# Patient Record
Sex: FEMALE | HISPANIC OR LATINO | URBAN - METROPOLITAN AREA
[De-identification: names, ages, dates, MRNs, and addresses within clinical notes are randomized per-mention and may not be internally consistent; named-entity substitution may affect disease eponyms.]

---

## 2023-09-19 ENCOUNTER — APPOINTMENT (RX ONLY)
Dept: URBAN - METROPOLITAN AREA CLINIC 93 | Facility: CLINIC | Age: 43
Setting detail: DERMATOLOGY
End: 2023-09-19

## 2023-09-19 DIAGNOSIS — L70.0 ACNE VULGARIS: ICD-10-CM | Status: INADEQUATELY CONTROLLED

## 2023-09-19 PROCEDURE — ? FULL BODY SKIN EXAM - DECLINED

## 2023-09-19 PROCEDURE — ? PRESCRIPTION

## 2023-09-19 PROCEDURE — ? RECOMMENDATIONS

## 2023-09-19 PROCEDURE — 99204 OFFICE O/P NEW MOD 45 MIN: CPT

## 2023-09-19 PROCEDURE — ? COUNSELING

## 2023-09-19 RX ORDER — DOXYCYCLINE HYCLATE 60 MG/1
TABLET, DELAYED RELEASE ORAL
Qty: 90 | Refills: 1 | Status: ERX | COMMUNITY
Start: 2023-09-19

## 2023-09-19 RX ORDER — CLINDAMYCIN PHOSPHATE AND BENZOYL PEROXIDE 10; 37.5 MG/G; MG/G
GEL TOPICAL
Qty: 50 | Refills: 3 | Status: ERX | COMMUNITY
Start: 2023-09-19

## 2023-09-19 RX ADMIN — CLINDAMYCIN PHOSPHATE AND BENZOYL PEROXIDE: 10; 37.5 GEL TOPICAL at 00:00

## 2023-09-19 RX ADMIN — DOXYCYCLINE HYCLATE: 60 TABLET, DELAYED RELEASE ORAL at 00:00

## 2023-09-19 ASSESSMENT — LOCATION DETAILED DESCRIPTION DERM
LOCATION DETAILED: LEFT INFERIOR MEDIAL MALAR CHEEK
LOCATION DETAILED: RIGHT INFERIOR MEDIAL MALAR CHEEK

## 2023-09-19 ASSESSMENT — LOCATION SIMPLE DESCRIPTION DERM
LOCATION SIMPLE: RIGHT CHEEK
LOCATION SIMPLE: LEFT CHEEK

## 2023-09-19 ASSESSMENT — LOCATION ZONE DERM: LOCATION ZONE: FACE

## 2023-09-19 NOTE — PROCEDURE: COUNSELING
Doxycycline Pregnancy And Lactation Text: This medication is Pregnancy Category D and not consider safe during pregnancy. It is also excreted in breast milk but is considered safe for shorter treatment courses.
Dapsone Counseling: I discussed with the patient the risks of dapsone including but not limited to hemolytic anemia, agranulocytosis, rashes, methemoglobinemia, kidney failure, peripheral neuropathy, headaches, GI upset, and liver toxicity.  Patients who start dapsone require monitoring including baseline LFTs and weekly CBCs for the first month, then every month thereafter.  The patient verbalized understanding of the proper use and possible adverse effects of dapsone.  All of the patient's questions and concerns were addressed.
Spironolactone Pregnancy And Lactation Text: This medication can cause feminization of the male fetus and should be avoided during pregnancy. The active metabolite is also found in breast milk.
Sarecycline Counseling: Patient advised regarding possible photosensitivity and discoloration of the teeth, skin, lips, tongue and gums.  Patient instructed to avoid sunlight, if possible.  When exposed to sunlight, patients should wear protective clothing, sunglasses, and sunscreen.  The patient was instructed to call the office immediately if the following severe adverse effects occur:  hearing changes, easy bruising/bleeding, severe headache, or vision changes.  The patient verbalized understanding of the proper use and possible adverse effects of sarecycline.  All of the patient's questions and concerns were addressed.
Topical Clindamycin Counseling: Patient counseled that this medication may cause skin irritation or allergic reactions.  In the event of skin irritation, the patient was advised to reduce the amount of the drug applied or use it less frequently.   The patient verbalized understanding of the proper use and possible adverse effects of clindamycin.  All of the patient's questions and concerns were addressed.
Birth Control Pills Counseling: Birth Control Pill Counseling: I discussed with the patient the potential side effects of OCPs including but not limited to increased risk of stroke, heart attack, thrombophlebitis, deep venous thrombosis, hepatic adenomas, breast changes, GI upset, headaches, and depression.  The patient verbalized understanding of the proper use and possible adverse effects of OCPs. All of the patient's questions and concerns were addressed.
Benzoyl Peroxide Counseling: Patient counseled that medicine may cause skin irritation and bleach clothing.  In the event of skin irritation, the patient was advised to reduce the amount of the drug applied or use it less frequently.   The patient verbalized understanding of the proper use and possible adverse effects of benzoyl peroxide.  All of the patient's questions and concerns were addressed.
Azithromycin Pregnancy And Lactation Text: This medication is considered safe during pregnancy and is also secreted in breast milk.
Doxycycline Counseling:  Patient counseled regarding possible photosensitivity and increased risk for sunburn.  Patient instructed to avoid sunlight, if possible.  When exposed to sunlight, patients should wear protective clothing, sunglasses, and sunscreen.  The patient was instructed to call the office immediately if the following severe adverse effects occur:  hearing changes, easy bruising/bleeding, severe headache, or vision changes.  The patient verbalized understanding of the proper use and possible adverse effects of doxycycline.  All of the patient's questions and concerns were addressed.
Birth Control Pills Pregnancy And Lactation Text: This medication should be avoided if pregnant and for the first 30 days post-partum.
Bactrim Counseling:  I discussed with the patient the risks of sulfa antibiotics including but not limited to GI upset, allergic reaction, drug rash, diarrhea, dizziness, photosensitivity, and yeast infections.  Rarely, more serious reactions can occur including but not limited to aplastic anemia, agranulocytosis, methemoglobinemia, blood dyscrasias, liver or kidney failure, lung infiltrates or desquamative/blistering drug rashes.
Topical Sulfur Applications Pregnancy And Lactation Text: This medication is Pregnancy Category C and has an unknown safety profile during pregnancy. It is unknown if this topical medication is excreted in breast milk.
High Dose Vitamin A Pregnancy And Lactation Text: High dose vitamin A therapy is contraindicated during pregnancy and breast feeding.
Topical Retinoid counseling:  Patient advised to apply a pea-sized amount only at bedtime and wait 30 minutes after washing their face before applying.  If too drying, patient may add a non-comedogenic moisturizer. The patient verbalized understanding of the proper use and possible adverse effects of retinoids.  All of the patient's questions and concerns were addressed.
Tetracycline Counseling: Patient counseled regarding possible photosensitivity and increased risk for sunburn.  Patient instructed to avoid sunlight, if possible.  When exposed to sunlight, patients should wear protective clothing, sunglasses, and sunscreen.  The patient was instructed to call the office immediately if the following severe adverse effects occur:  hearing changes, easy bruising/bleeding, severe headache, or vision changes.  The patient verbalized understanding of the proper use and possible adverse effects of tetracycline.  All of the patient's questions and concerns were addressed. Patient understands to avoid pregnancy while on therapy due to potential birth defects.
Minocycline Counseling: Patient advised regarding possible photosensitivity and discoloration of the teeth, skin, lips, tongue and gums.  Patient instructed to avoid sunlight, if possible.  When exposed to sunlight, patients should wear protective clothing, sunglasses, and sunscreen.  The patient was instructed to call the office immediately if the following severe adverse effects occur:  hearing changes, easy bruising/bleeding, severe headache, or vision changes.  The patient verbalized understanding of the proper use and possible adverse effects of minocycline.  All of the patient's questions and concerns were addressed.
Isotretinoin Counseling: Patient should get monthly blood tests, not donate blood, not drive at night if vision affected, not share medication, and not undergo elective surgery for 6 months after tx completed. Side effects reviewed, pt to contact office should one occur.
Tazorac Pregnancy And Lactation Text: This medication is not safe during pregnancy. It is unknown if this medication is excreted in breast milk.
Use Enhanced Medication Counseling?: No
Dapsone Pregnancy And Lactation Text: This medication is Pregnancy Category C and is not considered safe during pregnancy or breast feeding.
Topical Clindamycin Pregnancy And Lactation Text: This medication is Pregnancy Category B and is considered safe during pregnancy. It is unknown if it is excreted in breast milk.
Topical Retinoid Pregnancy And Lactation Text: This medication is Pregnancy Category C. It is unknown if this medication is excreted in breast milk.
Spironolactone Counseling: Patient advised regarding risks of diarrhea, abdominal pain, hyperkalemia, birth defects (for female patients), liver toxicity and renal toxicity. The patient may need blood work to monitor liver and kidney function and potassium levels while on therapy. The patient verbalized understanding of the proper use and possible adverse effects of spironolactone.  All of the patient's questions and concerns were addressed.
Isotretinoin Pregnancy And Lactation Text: This medication is Pregnancy Category X and is considered extremely dangerous during pregnancy. It is unknown if it is excreted in breast milk.
Sarecycline Pregnancy And Lactation Text: This medication is Pregnancy Category D and not consider safe during pregnancy. It is also excreted in breast milk.
Tazorac Counseling:  Patient advised that medication is irritating and drying.  Patient may need to apply sparingly and wash off after an hour before eventually leaving it on overnight.  The patient verbalized understanding of the proper use and possible adverse effects of tazorac.  All of the patient's questions and concerns were addressed.
Topical Sulfur Applications Counseling: Topical Sulfur Counseling: Patient counseled that this medication may cause skin irritation or allergic reactions.  In the event of skin irritation, the patient was advised to reduce the amount of the drug applied or use it less frequently.   The patient verbalized understanding of the proper use and possible adverse effects of topical sulfur application.  All of the patient's questions and concerns were addressed.
Detail Level: Zone
High Dose Vitamin A Counseling: Side effects reviewed, pt to contact office should one occur.
Erythromycin Pregnancy And Lactation Text: This medication is Pregnancy Category B and is considered safe during pregnancy. It is also excreted in breast milk.
Bactrim Pregnancy And Lactation Text: This medication is Pregnancy Category D and is known to cause fetal risk.  It is also excreted in breast milk.
Benzoyl Peroxide Pregnancy And Lactation Text: This medication is Pregnancy Category C. It is unknown if benzoyl peroxide is excreted in breast milk.
Azithromycin Counseling:  I discussed with the patient the risks of azithromycin including but not limited to GI upset, allergic reaction, drug rash, diarrhea, and yeast infections.
Erythromycin Counseling:  I discussed with the patient the risks of erythromycin including but not limited to GI upset, allergic reaction, drug rash, diarrhea, increase in liver enzymes, and yeast infections.
Winlevi Pregnancy And Lactation Text: This medication is considered safe during pregnancy and breastfeeding.
Aklief Pregnancy And Lactation Text: It is unknown if this medication is safe to use during pregnancy.  It is unknown if this medication is excreted in breast milk.  Breastfeeding women should use the topical cream on the smallest area of the skin for the shortest time needed while breastfeeding.  Do not apply to nipple and areola.
Azelaic Acid Pregnancy And Lactation Text: This medication is considered safe during pregnancy and breast feeding.
Winlevi Counseling:  I discussed with the patient the risks of topical clascoterone including but not limited to erythema, scaling, itching, and stinging. Patient voiced their understanding.
Aklief counseling:  Patient advised to apply a pea-sized amount only at bedtime and wait 30 minutes after washing their face before applying.  If too drying, patient may add a non-comedogenic moisturizer.  The most commonly reported side effects including irritation, redness, scaling, dryness, stinging, burning, itching, and increased risk of sunburn.  The patient verbalized understanding of the proper use and possible adverse effects of retinoids.  All of the patient's questions and concerns were addressed.
Azelaic Acid Counseling: Patient counseled that medicine may cause skin irritation and to avoid applying near the eyes.  In the event of skin irritation, the patient was advised to reduce the amount of the drug applied or use it less frequently.   The patient verbalized understanding of the proper use and possible adverse effects of azelaic acid.  All of the patient's questions and concerns were addressed.

## 2023-10-31 ENCOUNTER — APPOINTMENT (RX ONLY)
Dept: URBAN - METROPOLITAN AREA CLINIC 93 | Facility: CLINIC | Age: 43
Setting detail: DERMATOLOGY
End: 2023-10-31

## 2023-10-31 DIAGNOSIS — L70.0 ACNE VULGARIS: ICD-10-CM

## 2023-10-31 PROCEDURE — ? COUNSELING

## 2023-10-31 PROCEDURE — ? PRESCRIPTION MEDICATION MANAGEMENT

## 2023-10-31 PROCEDURE — ? PRESCRIPTION

## 2023-10-31 PROCEDURE — ? RECOMMENDATIONS

## 2023-10-31 PROCEDURE — 99214 OFFICE O/P EST MOD 30 MIN: CPT

## 2023-10-31 RX ORDER — DOXYCYCLINE HYCLATE 60 MG/1
TABLET, DELAYED RELEASE ORAL
Qty: 90 | Refills: 0 | Status: ERX

## 2023-10-31 ASSESSMENT — LOCATION SIMPLE DESCRIPTION DERM
LOCATION SIMPLE: RIGHT CHEEK
LOCATION SIMPLE: LEFT CHEEK

## 2023-10-31 ASSESSMENT — LOCATION ZONE DERM: LOCATION ZONE: FACE

## 2023-10-31 NOTE — PROCEDURE: PRESCRIPTION MEDICATION MANAGEMENT
Continue Regimen: Onexton 1.2 % (1 % base)-3.75 % topical gel with pump BID
Modify Regimen: Doryx MPC 60 mg tablet,delayed release: decrease from TID to BID
Render In Strict Bullet Format?: No
Plan: significant improvement on treatment\\ndecreasing PO dosage to BID\\nwill continue to monitor
Detail Level: Zone

## 2023-12-12 ENCOUNTER — APPOINTMENT (RX ONLY)
Dept: URBAN - METROPOLITAN AREA CLINIC 93 | Facility: CLINIC | Age: 43
Setting detail: DERMATOLOGY
End: 2023-12-12

## 2023-12-12 DIAGNOSIS — L70.0 ACNE VULGARIS: ICD-10-CM

## 2023-12-12 PROCEDURE — ? RECOMMENDATIONS

## 2023-12-12 PROCEDURE — 99214 OFFICE O/P EST MOD 30 MIN: CPT

## 2023-12-12 PROCEDURE — ? PRESCRIPTION MEDICATION MANAGEMENT

## 2023-12-12 PROCEDURE — ? COUNSELING

## 2023-12-12 PROCEDURE — ? PRESCRIPTION

## 2023-12-12 RX ORDER — CLINDAMYCIN PHOSPHATE AND BENZOYL PEROXIDE 10; 37.5 MG/G; MG/G
GEL TOPICAL
Qty: 50 | Refills: 6 | Status: ERX

## 2023-12-12 ASSESSMENT — LOCATION SIMPLE DESCRIPTION DERM
LOCATION SIMPLE: LEFT CHEEK
LOCATION SIMPLE: RIGHT CHEEK

## 2023-12-12 ASSESSMENT — LOCATION ZONE DERM: LOCATION ZONE: FACE

## 2023-12-12 ASSESSMENT — LOCATION DETAILED DESCRIPTION DERM
LOCATION DETAILED: RIGHT INFERIOR MEDIAL MALAR CHEEK
LOCATION DETAILED: LEFT INFERIOR MEDIAL MALAR CHEEK

## 2023-12-12 NOTE — PROCEDURE: PRESCRIPTION MEDICATION MANAGEMENT
Continue Regimen: Onexton 1.2 % (1 % base)-3.75 % topical gel BID
Modify Regimen: Doryx MPC 60 mg tablet; decrease from BID to QD x 1 month then d/c (pt has enough medication remaining at home)
Render In Strict Bullet Format?: No
Plan: significant improvement on treatment\\nwill continue to monitor\\nconsider topical retinoid rx as warranted
Detail Level: Zone

## 2024-03-01 ENCOUNTER — APPOINTMENT (RX ONLY)
Dept: URBAN - METROPOLITAN AREA CLINIC 93 | Facility: CLINIC | Age: 44
Setting detail: DERMATOLOGY
End: 2024-03-01

## 2024-03-01 DIAGNOSIS — L70.0 ACNE VULGARIS: ICD-10-CM | Status: IMPROVED

## 2024-03-01 PROCEDURE — ? RECOMMENDATIONS

## 2024-03-01 PROCEDURE — 99214 OFFICE O/P EST MOD 30 MIN: CPT

## 2024-03-01 PROCEDURE — ? COUNSELING

## 2024-03-01 PROCEDURE — ? PRESCRIPTION MEDICATION MANAGEMENT

## 2024-03-01 PROCEDURE — ? PRESCRIPTION

## 2024-03-01 RX ORDER — TRIFAROTENE 50 UG/G
CREAM TOPICAL
Qty: 45 | Refills: 3 | Status: CANCELLED | COMMUNITY
Start: 2024-03-01

## 2024-03-01 RX ORDER — TRIFAROTENE 50 UG/G
CREAM TOPICAL
Qty: 45 | Refills: 3 | Status: ERX

## 2024-03-01 RX ORDER — CLINDAMYCIN PHOSPHATE AND BENZOYL PEROXIDE 10; 37.5 MG/G; MG/G
GEL TOPICAL
Qty: 50 | Refills: 6 | Status: CANCELLED
Stop reason: SDUPTHER

## 2024-03-01 RX ADMIN — TRIFAROTENE: 50 CREAM TOPICAL at 00:00

## 2024-03-01 ASSESSMENT — LOCATION SIMPLE DESCRIPTION DERM
LOCATION SIMPLE: RIGHT CHEEK
LOCATION SIMPLE: LEFT CHEEK

## 2024-03-01 ASSESSMENT — LOCATION ZONE DERM: LOCATION ZONE: FACE

## 2024-03-01 NOTE — PROCEDURE: PRESCRIPTION MEDICATION MANAGEMENT
Continue Regimen: Onexton 1.2 % (1 % base)-3.75 % topical gel BID
Discontinue Regimen: Doryx MPC 60 mg tablet
Render In Strict Bullet Format?: No
Plan: significant improvement on treatment\\nincorporating topical retinoid\\nwill continue to monitor
Detail Level: Zone

## 2024-03-28 ENCOUNTER — APPOINTMENT (RX ONLY)
Dept: URBAN - METROPOLITAN AREA CLINIC 93 | Facility: CLINIC | Age: 44
Setting detail: DERMATOLOGY
End: 2024-03-28

## 2024-03-28 DIAGNOSIS — Z41.9 ENCOUNTER FOR PROCEDURE FOR PURPOSES OTHER THAN REMEDYING HEALTH STATE, UNSPECIFIED: ICD-10-CM

## 2024-03-28 PROCEDURE — ? ADDITIONAL NOTES

## 2024-03-28 PROCEDURE — ? PRODUCT LINE (REVISION)

## 2024-03-28 PROCEDURE — ? COSMETIC CONSULTATION: HYDRAFACIAL

## 2024-03-28 PROCEDURE — ? PRODUCT LINE (OBAGI)

## 2024-03-28 PROCEDURE — ? COSMETIC QUOTE

## 2024-03-28 PROCEDURE — ? COSMETIC CONSULTATION - MICRO-NEEDLING

## 2024-03-28 NOTE — PROCEDURE: COSMETIC QUOTE
Include Sales Tax On Implants: Yes
Body Procedure 10 Price/Unit (In Dollars- Use Only Numbers And Decimals): 0.00
Breast Procedure 4 Units: 0
Include Sales Tax On Facility Fees: No
Laser 1: Venus Versa IPL
Face Procedure 1 Price/Unit (In Dollars- Use Only Numbers And Decimals): 398.41
Number Of Months: 1
Face Procedure 1 Percentage Discount: 15
Face Procedure 2 Price/Unit (In Dollars- Use Only Numbers And Decimals): 265
Face Procedure 1: microneedling (includes Revision CMT post procedure cream)
Laser 1 Price/Unit (In Dollars- Use Only Numbers And Decimals): 400
Face Procedure 1 Units: 4
Face Procedure 2: Deluxe HydraFacial
Detail Level: Zone
Face Procedure 2 Units: 2

## 2024-03-28 NOTE — PROCEDURE: PRODUCT LINE (REVISION)
Product 13 Units: 0
Name Of Product 8: DEJ eye cream
Product 16 Price (In Dollars - Numeric Only, No Special Characters Or $): 0.00
Allow Plan To Count Towards E/M Coding: Yes
Product 4 Application Directions: each morning after cleansing and application of facial serums apply intellishade truphysical
Product 4 Units: 1
Product 8 Price (In Dollars - Numeric Only, No Special Characters Or $): 121.41
Name Of Product 2: Vitmamin C Correction cream
Name Of Product 5: C+ brightening eye complex
Send Charges To Patient Encounter: No
Product 8 Application Directions: after facial cleansing apply a pea sized amount to lower eyelids twice daily.
Product 2 Price (In Dollars - Numeric Only, No Special Characters Or $): 181.05
Product 5 Price (In Dollars - Numeric Only, No Special Characters Or $): 119.37
Name Of Product 9: Revision Starter kit
Product 2 Application Directions: After facial cleansing and application of Obagi Retivance, apply Revision Vitamin C Correction cream follow with application of sun screen.
Product 5 Application Directions: each morning and evening after facial cleansing, apply a small amount onto lower eyelids. follow with daily mosturizer
Name Of Product 1: Brightening faceial cleanser
Product 9 Price (In Dollars - Numeric Only, No Special Characters Or $): 199.16
Name Of Product 3: Revision Retinol.05
Product 3 Price (In Dollars - Numeric Only, No Special Characters Or $): 110.50
Name Of Product 6: retinol 1.0
Product 1 Price (In Dollars - Numeric Only, No Special Characters Or $): 42.60
Product 6 Price (In Dollars - Numeric Only, No Special Characters Or $): 135.26
Detail Level: Zone
Name Of Product 7: DEJ Face Cream
Product 7 Price (In Dollars - Numeric Only, No Special Characters Or $): 168.27
Product 1 Application Directions: each morning and evening use brightening facial cleanser to gently cleanse and exfoliate skin.
Product 3 Application Directions: In the evening after facial cleansing, and application of Vitamin C correction cream, apply a pea sized amount of retinol onto the full face.
Product 6 Application Directions: for the 4-8 weeks of product use, utilize retinol1.0 on Monday, Wednesday, and Friday nights only. method of use: in the evening after facial cleansing, apply a peas sized amount of retinol onto the entire face (avoiding the corners of the eyes, nose, and mouth) then follow with evening moisturizer.
Name Of Product 4: Intellishade Trusphysical daily moisturizer
Assigning Risk Information: Per AMA, level of risk is based upon consequences of the problem(s) addressed at the encounter when appropriately treated. Risk also includes medical decision making related to the need to initiate or forego further testing, treatment and/or hospitalization. Over the counter medication are assigned a risk level of low. Prescription medication management is assigned a risk level of moderate.
Risk Of Complication Category: No MDM
Product 4 Price (In Dollars - Numeric Only, No Special Characters Or $): 85.20
Product 7 Application Directions: each evening following facial cleansing apply DEJ Face cream onto full face follow with Vitamin C correction cream

## 2024-03-28 NOTE — PROCEDURE: ADDITIONAL NOTES
Render Risk Assessment In Note?: no
Additional Notes: The patient is present due to concerns of acne scarring, enlarged pores, pore congestions, and milia. Upon evaluation of her skin I informed her that it would be in her best interest to receive a combination of Deluxe HydraFacial treatments and SkinPen microneedling treatments. The patient and I reviewed the following treatment details:\\n\\nHydraFacial Deluxe treatment: The patient was informed that this is a 6 step treatment process targeting pore congestion and maximizing her overall hydration. I reviewed the following 6 step treatment process: hydradermabrasion, gly/sal peel, extractions, murad clarifying booster, blue led light therapy, and antiox+. The patient was quoted $265/Deluxe HydraFacial treatment.\\n\\nMicroneedling: The patient was informed that microneedling is an organic mechanical treatment approach that utilizes the body's natural ability to promote collagen remodeling, cellular regeneration, and promotion of elasticity of the skin. The patient was informed that she would most benefit from a series of 4 microneedling treatments performed 1 month apart from each other. The patient was quoted $398.23/microneedling full face treatment (includes Revision CMT post procedure cream)
Detail Level: Simple

## 2024-03-28 NOTE — PROCEDURE: PRODUCT LINE (OBAGI)
Risk Of Complication Category: No MDM
Product 45 Units: 0
Product 22 Price (In Dollars - Numeric Only, No Special Characters Or $): 0.00
Name Of Product 5: Clear
Name Of Product 8: rebalance
Product 2 Price (In Dollars - Numeric Only, No Special Characters Or $): 119.28
Product 5 Price (In Dollars - Numeric Only, No Special Characters Or $): 138.45
Product 11 Application Directions: Mornings:\\n1. foaming cleanser\\n2. Nu Derm toner (use round cotton pad for application)\\n3. Clear (pea sized amount for full face application)\\n4. Exfoderm forte (pea sized amount for full face application)\\n5. Hydrate\\n6. Sunshield\\n\\nEvenings:\\n1. Foaming cleanser\\n2. Nu derm toner\\n3. Clear (pea sized amount for full face)\\n4.  (mix pea sized amount of  with pea sized amount of Obagi .05 retinol)\\n5. Hydrate\\n\\n***Use Obagi .05 retinol on Monday, Wednesday, and Thursday nights only, until instructed otherwise. The nights you do not use retinol you will simply use the  on its own.***
Allow Plan To Count Towards E/M Coding: No (this will remove associated impression from E/M calculation)
Product 8 Price (In Dollars - Numeric Only, No Special Characters Or $): 117.15
Product 2 Application Directions: each morning after facial cleansing apply 5-6 drops of Vitamin C serum onto full face, follow with Hydro drops, daily moisturizer and SPF
Name Of Product 12: Retivance
Product 5 Application Directions: Each morning and evening after cleansing posterior neck, apply a pea sized amount on to region and spread evenly. Follow with gentle moisturizer and SPF for morning use.
Send Charges To Patient Encounter: No
Product 12 Price (In Dollars - Numeric Only, No Special Characters Or $): 138
Name Of Product 3: Obagi Hydrodrops
Product 8 Application Directions: each morning and evening apply rebalance moisturizer following application of facial serums. For mornings apply Obagi Warm sun shield SPF.
Name Of Product 6: Elastiderm facial serum
Render Product Pricing In Note: Yes
Product 6 Price (In Dollars - Numeric Only, No Special Characters Or $): 210.87
Name Of Product 9: Clenziderm pore therapy
Name Of Product 10: Obagi Clenziderm therapeutic moisturizer
Product 9 Price (In Dollars - Numeric Only, No Special Characters Or $): 46.86
Product 3 Application Directions: after facial cleansing each morning and evening apply 5-6 drops to full face follow with daily moisturizer and SPF
Product 9 Units: 1
Name Of Product 1: Obagi C Clarifying serum (normal to oily)
Name Of Product 4: retinol .05
Product 6 Application Directions: each morning and evening after facial cleansing apply elastiderm facial serum onto full face and follow with daily moistruizer/spf.
Product 9 Application Directions: after cleansing each morning, using a round cotton pad apply 3-4 pumps of toner and wipe the entire face with pore therapy toner. following this apply Vitamin C Serum, daily moisturizer, and mineral based SPF.
Product 1 Price (In Dollars - Numeric Only, No Special Characters Or $): 157.42
Product 4 Price (In Dollars - Numeric Only, No Special Characters Or $): 65.50
Name Of Product 7: Elastiderm neck and décolleté serum
Product 12 Application Directions: Each morning and evening apply 2-3 pumps of product onto face and neck. You may apply this topical on the lower eyelids. follow with application of Revision Vitamin C correction cream and SPF.
Product 10 Application Directions: each morning and evening apply therapeutic moisturizer onto full face following serum application and/or topical medication application
Product 7 Price (In Dollars - Numeric Only, No Special Characters Or $): 392.25
Detail Level: Zone
Name Of Product 11: Obagi Nu Derm kit normal to oily
Product 1 Application Directions: apply after facial cleansing each morning follow with daily moisturizer with SPF
Product 4 Application Directions: for the first month apply after facial cleansing in the evenings on Mundays, Wednesdays, and Fridays. follow application with daily moisturizer
Name Of Product 2: Obagi Professional C Serum 15%
Product 7 Application Directions: each morning and evening after facial cleansing apply elastiderm neck and décolleté serum of neck and décolleté follow with daily moisturizer.
Assigning Risk Information: Per AMA, level of risk is based upon consequences of the problem(s) addressed at the encounter when appropriately treated. Risk also includes medical decision making related to the need to initiate or forego further testing, treatment and/or hospitalization. Over the counter medication are assigned a risk level of low. Prescription medication management is assigned a risk level of moderate.
Product 11 Price (In Dollars - Numeric Only, No Special Characters Or $): 500

## 2024-03-29 ENCOUNTER — APPOINTMENT (RX ONLY)
Dept: URBAN - METROPOLITAN AREA CLINIC 93 | Facility: CLINIC | Age: 44
Setting detail: DERMATOLOGY
End: 2024-03-29

## 2024-03-29 DIAGNOSIS — Z41.9 ENCOUNTER FOR PROCEDURE FOR PURPOSES OTHER THAN REMEDYING HEALTH STATE, UNSPECIFIED: ICD-10-CM

## 2024-03-29 PROCEDURE — ? COSMETIC CONSULTATION: HYDRAFACIAL

## 2024-03-29 PROCEDURE — ? HYDRAFACIAL

## 2024-03-29 ASSESSMENT — LOCATION ZONE DERM
LOCATION ZONE: FACE
LOCATION ZONE: NOSE

## 2024-03-29 ASSESSMENT — LOCATION SIMPLE DESCRIPTION DERM
LOCATION SIMPLE: INFERIOR FOREHEAD
LOCATION SIMPLE: LEFT CHEEK
LOCATION SIMPLE: RIGHT CHEEK
LOCATION SIMPLE: CHIN
LOCATION SIMPLE: NOSE

## 2024-03-29 ASSESSMENT — LOCATION DETAILED DESCRIPTION DERM
LOCATION DETAILED: NASAL SUPRATIP
LOCATION DETAILED: LEFT INFERIOR CENTRAL MALAR CHEEK
LOCATION DETAILED: INFERIOR MID FOREHEAD
LOCATION DETAILED: LEFT CHIN
LOCATION DETAILED: RIGHT INFERIOR CENTRAL MALAR CHEEK

## 2024-03-29 NOTE — PROCEDURE: HYDRAFACIAL
Indication: acne
Vacuum Pressure Low Setting (Will Not Render If Set To 0): 16
Vacuum Pressure High Setting (Will Not Render If Set To 0): 0
Number Of Passes Step 3: 2
Number Of Passes Step 6: 3
Solution Override: Murad Clarifying Serum
Procedure: Extraction
Solution Override
Number Of Passes Step 5: 1
Tip: Hydropeel Tip, Clear
Procedure: Exfoliation
Procedure: Boost
Tip Override
Vacuum Pressure Low Setting (Will Not Render If Set To 0): 22
Location: face
Tip: Hydropeel Tip, Teal
Solution Override: Antiox+
Procedure: Peel
Consent: Written consent obtained, risks reviewed including but not limited to crusting, scabbing, blistering, scarring, darker or lighter pigmentary change, bruising, and/or incomplete response.
Solution Override: extract
Vacuum Pressure High Setting (Will Not Render If Set To 0): 24
Post-Care Instructions: I reviewed with the patient in detail post-care instructions. Patient should stay away from the sun and wear sun protection until treated areas are fully healed.
Procedure: Extend and Protect
Solution Override: cleanse
Tip: Hydropeel Tip, Purple Aggression
Price (Use Numbers Only, No Special Characters Or $): 820
Solution: GlySal 7.5%

## 2024-06-07 ENCOUNTER — RX ONLY (OUTPATIENT)
Age: 44
Setting detail: RX ONLY
End: 2024-06-07

## 2024-06-07 ENCOUNTER — APPOINTMENT (RX ONLY)
Dept: URBAN - METROPOLITAN AREA CLINIC 93 | Facility: CLINIC | Age: 44
Setting detail: DERMATOLOGY
End: 2024-06-07

## 2024-06-07 DIAGNOSIS — L70.0 ACNE VULGARIS: ICD-10-CM

## 2024-06-07 PROCEDURE — 99214 OFFICE O/P EST MOD 30 MIN: CPT

## 2024-06-07 PROCEDURE — ? PRESCRIPTION MEDICATION MANAGEMENT

## 2024-06-07 PROCEDURE — ? COUNSELING

## 2024-06-07 RX ORDER — TRIFAROTENE 50 UG/G
CREAM TOPICAL
Qty: 45 | Refills: 3 | Status: ERX | COMMUNITY
Start: 2024-06-07

## 2024-06-07 RX ORDER — CLINDAMYCIN PHOSPHATE AND BENZOYL PEROXIDE 10; 37.5 MG/G; MG/G
GEL TOPICAL
Qty: 50 | Refills: 6 | Status: ERX

## 2024-06-07 ASSESSMENT — LOCATION SIMPLE DESCRIPTION DERM
LOCATION SIMPLE: LEFT CHEEK
LOCATION SIMPLE: RIGHT CHEEK

## 2024-06-07 ASSESSMENT — LOCATION ZONE DERM: LOCATION ZONE: FACE

## 2024-06-07 NOTE — PROCEDURE: PRESCRIPTION MEDICATION MANAGEMENT
Continue Regimen: One[place] 1.2 % (1 % base)-3.75 % topical gel QAM, Aklief QHS
Render In Strict Bullet Format?: No
Plan: Significant improvement on treatment\\nPt has been tolerating topical retinoid well\\nWill continue to monitor
Detail Level: Zone

## 2025-02-17 ENCOUNTER — APPOINTMENT (OUTPATIENT)
Dept: URBAN - METROPOLITAN AREA CLINIC 93 | Facility: CLINIC | Age: 45
Setting detail: DERMATOLOGY
End: 2025-02-17

## 2025-02-17 DIAGNOSIS — Z41.9 ENCOUNTER FOR PROCEDURE FOR PURPOSES OTHER THAN REMEDYING HEALTH STATE, UNSPECIFIED: ICD-10-CM

## 2025-02-17 PROCEDURE — ? HYDRAFACIAL

## 2025-02-17 PROCEDURE — ? COSMETIC CONSULTATION: HYDRAFACIAL

## 2025-02-17 ASSESSMENT — LOCATION ZONE DERM
LOCATION ZONE: FACE
LOCATION ZONE: NOSE

## 2025-02-17 ASSESSMENT — LOCATION SIMPLE DESCRIPTION DERM
LOCATION SIMPLE: NOSE
LOCATION SIMPLE: INFERIOR FOREHEAD
LOCATION SIMPLE: RIGHT CHEEK
LOCATION SIMPLE: CHIN
LOCATION SIMPLE: LEFT CHEEK

## 2025-02-17 ASSESSMENT — LOCATION DETAILED DESCRIPTION DERM
LOCATION DETAILED: LEFT INFERIOR CENTRAL MALAR CHEEK
LOCATION DETAILED: RIGHT INFERIOR CENTRAL MALAR CHEEK
LOCATION DETAILED: LEFT CHIN
LOCATION DETAILED: INFERIOR MID FOREHEAD
LOCATION DETAILED: NASAL SUPRATIP

## 2025-02-17 NOTE — PROCEDURE: HYDRAFACIAL
Indication: acne
Vacuum Pressure Low Setting (Will Not Render If Set To 0): 16
Vacuum Pressure High Setting (Will Not Render If Set To 0): 0
Number Of Passes Step 3: 2
Number Of Passes Step 6: 3
Solution Override: Advanced skin renewal serum
Procedure: Extraction
Solution Override
Number Of Passes Step 5: 1
Comments: Patient provided discount as professional courtesy due to front staff not informing me of patient arrival until 35 minutes past her appointment time.
Tip: Hydropeel Tip, Clear
Procedure: Exfoliation
Procedure: Boost
Tip Override
Vacuum Pressure Low Setting (Will Not Render If Set To 0): 22
Location: face
Tip: Hydropeel Tip, Teal
Solution Override: Antiox+
Procedure: Peel
Consent: Written consent obtained, risks reviewed including but not limited to crusting, scabbing, blistering, scarring, darker or lighter pigmentary change, bruising, and/or incomplete response.
Solution Override: extract
Vacuum Pressure High Setting (Will Not Render If Set To 0): 24
Post-Care Instructions: I reviewed with the patient in detail post-care instructions. Patient should stay away from the sun and wear sun protection until treated areas are fully healed.
Procedure: Extend and Protect
Solution Override: cleanse
Tip: Hydropeel Tip, Purple Aggression
Price (Use Numbers Only, No Special Characters Or $): 199
Solution: GlySal 7.5%